# Patient Record
Sex: MALE | Race: WHITE
[De-identification: names, ages, dates, MRNs, and addresses within clinical notes are randomized per-mention and may not be internally consistent; named-entity substitution may affect disease eponyms.]

---

## 2020-10-22 ENCOUNTER — HOSPITAL ENCOUNTER (OUTPATIENT)
Dept: HOSPITAL 95 - ER | Age: 85
Setting detail: OBSERVATION
LOS: 1 days | Discharge: HOME | End: 2020-10-23
Attending: HOSPITALIST | Admitting: HOSPITALIST
Payer: MEDICARE

## 2020-10-22 VITALS — BODY MASS INDEX: 22.81 KG/M2 | HEIGHT: 65 IN | WEIGHT: 136.91 LBS

## 2020-10-22 DIAGNOSIS — Z23: ICD-10-CM

## 2020-10-22 DIAGNOSIS — Z95.1: ICD-10-CM

## 2020-10-22 DIAGNOSIS — I10: ICD-10-CM

## 2020-10-22 DIAGNOSIS — Z88.0: ICD-10-CM

## 2020-10-22 DIAGNOSIS — G81.91: ICD-10-CM

## 2020-10-22 DIAGNOSIS — I25.2: ICD-10-CM

## 2020-10-22 DIAGNOSIS — Z79.01: ICD-10-CM

## 2020-10-22 DIAGNOSIS — I25.10: ICD-10-CM

## 2020-10-22 DIAGNOSIS — Z88.8: ICD-10-CM

## 2020-10-22 DIAGNOSIS — Z66: ICD-10-CM

## 2020-10-22 DIAGNOSIS — R47.81: ICD-10-CM

## 2020-10-22 DIAGNOSIS — I48.0: ICD-10-CM

## 2020-10-22 DIAGNOSIS — E78.5: ICD-10-CM

## 2020-10-22 DIAGNOSIS — I63.81: Primary | ICD-10-CM

## 2020-10-22 LAB
ALBUMIN SERPL BCP-MCNC: 3.8 G/DL (ref 3.4–5)
ALBUMIN/GLOB SERPL: 1.1 {RATIO} (ref 0.8–1.8)
ALT SERPL W P-5'-P-CCNC: 17 U/L (ref 12–78)
ANION GAP SERPL CALCULATED.4IONS-SCNC: 3 MMOL/L (ref 6–16)
AST SERPL W P-5'-P-CCNC: 22 U/L (ref 12–37)
BASOPHILS # BLD AUTO: 0.06 K/MM3 (ref 0–0.23)
BASOPHILS NFR BLD AUTO: 1 % (ref 0–2)
BILIRUB SERPL-MCNC: 0.3 MG/DL (ref 0.1–1)
BUN SERPL-MCNC: 29 MG/DL (ref 8–24)
CALCIUM SERPL-MCNC: 9.3 MG/DL (ref 8.5–10.1)
CHLORIDE SERPL-SCNC: 112 MMOL/L (ref 98–108)
CO2 SERPL-SCNC: 29 MMOL/L (ref 21–32)
CREAT SERPL-MCNC: 0.95 MG/DL (ref 0.6–1.2)
DEPRECATED RDW RBC AUTO: 43.7 FL (ref 35.1–46.3)
EOSINOPHIL # BLD AUTO: 0.15 K/MM3 (ref 0–0.68)
EOSINOPHIL NFR BLD AUTO: 3 % (ref 0–6)
ERYTHROCYTE [DISTWIDTH] IN BLOOD BY AUTOMATED COUNT: 12.4 % (ref 11.7–14.2)
GLOBULIN SER CALC-MCNC: 3.5 G/DL (ref 2.2–4)
GLUCOSE SERPL-MCNC: 109 MG/DL (ref 70–99)
HCT VFR BLD AUTO: 39.4 % (ref 37–53)
HGB BLD-MCNC: 13.1 G/DL (ref 13.5–17.5)
IMM GRANULOCYTES # BLD AUTO: 0.01 K/MM3 (ref 0–0.1)
IMM GRANULOCYTES NFR BLD AUTO: 0 % (ref 0–1)
LYMPHOCYTES # BLD AUTO: 0.65 K/MM3 (ref 0.84–5.2)
LYMPHOCYTES NFR BLD AUTO: 14 % (ref 21–46)
MCHC RBC AUTO-ENTMCNC: 33.2 G/DL (ref 31.5–36.5)
MCV RBC AUTO: 95 FL (ref 80–100)
MONOCYTES # BLD AUTO: 0.45 K/MM3 (ref 0.16–1.47)
MONOCYTES NFR BLD AUTO: 10 % (ref 4–13)
NEUTROPHILS # BLD AUTO: 3.33 K/MM3 (ref 1.96–9.15)
NEUTROPHILS NFR BLD AUTO: 72 % (ref 41–73)
NRBC # BLD AUTO: 0 K/MM3 (ref 0–0.02)
NRBC BLD AUTO-RTO: 0 /100 WBC (ref 0–0.2)
PLATELET # BLD AUTO: 241 K/MM3 (ref 150–400)
POTASSIUM SERPL-SCNC: 4.1 MMOL/L (ref 3.5–5.5)
PROT SERPL-MCNC: 7.3 G/DL (ref 6.4–8.2)
PROTHROMBIN TIME: 21 SEC (ref 9.7–11.5)
SODIUM SERPL-SCNC: 144 MMOL/L (ref 136–145)
SP GR SPEC: 1.02 (ref 1–1.02)
TROPONIN I SERPL-MCNC: <0.015 NG/ML (ref 0–0.04)
UROBILINOGEN UR STRIP-MCNC: (no result) MG/DL

## 2020-10-22 PROCEDURE — G0378 HOSPITAL OBSERVATION PER HR: HCPCS

## 2020-10-23 LAB
CHOLEST SERPL-MCNC: 250 MG/DL (ref 50–200)
CHOLEST/HDLC SERPL: 4.8 {RATIO}
HDLC SERPL-MCNC: 52 MG/DL (ref 39–?)
LDLC SERPL CALC-MCNC: 171 MG/DL (ref 0–110)
LDLC/HDLC SERPL: 3.3 {RATIO}
PROTHROMBIN TIME: 19.8 SEC (ref 9.7–11.5)
TRIGL SERPL-MCNC: 134 MG/DL (ref 30–160)
VLDLC SERPL CALC-MCNC: 26 MG/DL (ref 6–32)

## 2020-10-23 NOTE — NUR
DISCHARGE SUMMARY
ALVA LEFT WITH WIFE VIA WC TO GO TO THEIR RV AND CONTINUE DRIVE TO [place].
HAD ECHO AND MRI TODAY, DR ROYAL WILL CALL THEM IF THERE ARE ANY ABNORMALITIES
ON THE ECHO.  SBA TO BR.  FULLY ORIENTED, R SIDE DEFICIT SLIGHT, MOSLY FINE
MOTOR SKILLS IN R HAND.  PIV REMOVED, DC PAPERWORK GONE OVER WITH THEM.
COUMADIN TEACHING DONE, BUT IT IS NOT A NEW MED FOR THEM.  PLAVIX FAXED TO
[facility], THEY WILL PICK IT UP.

## 2020-10-23 NOTE — NUR
SHIFT SUMMARY/ADMIT NOTE
ADMITTED FOR CVA. DNR CODE. HANDOFF RECEIVED FROM ER NURSE ESPINOZA. PT ARRIVED TO
FLOOR VIA GURNEY. PT ORIENTED TO UNIT. PERSONAL POSSESSIONS WITH PT. CALL
BUTTON WITHIN REACH.
MRI SCREENING FORM COMPLETE. PT IS ON WARFARIN. HE HAS A HX OF AFIB. HE IS ON
TELEMETRY: NSR @ 64 BPM. RIGHT SIDED WEAKNESS, PT STATES IT IS IMPROVING. HE
IS INTOLERANT OF STATINS. HX: CAD-CABG, HLD.